# Patient Record
Sex: MALE | Race: WHITE | Employment: FULL TIME | URBAN - METROPOLITAN AREA
[De-identification: names, ages, dates, MRNs, and addresses within clinical notes are randomized per-mention and may not be internally consistent; named-entity substitution may affect disease eponyms.]

---

## 2019-10-15 ENCOUNTER — DOCUMENTATION ONLY (OUTPATIENT)
Dept: MATERNAL FETAL MEDICINE | Facility: CLINIC | Age: 34
End: 2019-10-15

## 2019-10-15 DIAGNOSIS — Z13.71 SCREENING FOR GENETIC DISEASE CARRIER STATUS: Primary | ICD-10-CM

## 2019-10-15 NOTE — PROGRESS NOTES
Order placed for GC only to facilitate carrier screening. Patient's partner (Janine Villalba,MRN: [id number]) is a carrier for cystic fibrosis and Pendred syndrome.    Trish Wade MS, [facility]  Maternal Fetal Medicine

## 2019-10-18 ENCOUNTER — OFFICE VISIT (OUTPATIENT)
Dept: MATERNAL FETAL MEDICINE | Facility: CLINIC | Age: 34
End: 2019-10-18
Attending: OBSTETRICS & GYNECOLOGY
Payer: COMMERCIAL

## 2019-10-18 DIAGNOSIS — Z13.71 SCREENING FOR GENETIC DISEASE CARRIER STATUS: ICD-10-CM

## 2019-10-18 PROCEDURE — 36415 COLL VENOUS BLD VENIPUNCTURE: CPT | Performed by: OBSTETRICS & GYNECOLOGY

## 2019-10-18 PROCEDURE — 40000954 ZZHCL STATISTIC COUNSYL FAMILY PREP: Performed by: OBSTETRICS & GYNECOLOGY

## 2019-10-18 PROCEDURE — 40000072 ZZH STATISTIC GENETIC COUNSELING, < 16 MIN: Mod: ZF | Performed by: GENETIC COUNSELOR, MS

## 2019-10-18 NOTE — PROGRESS NOTES
[facility]etal Medicine [place]  Genetic Counseling Consult    Patient:  Ilia Villalba YOB: 1985   Date of Service:  10/18/19      Ilia Villalba was seen at the [facility] for genetic consultation for the indication of carrier screening. The patient was unaccompanied to today's visit.        Impression/Plan:   1. The patient elected to pursue carrier screening through [facility] for cystic fibrosis and Pendred syndrome. Results are expected within 10-14 days, and will be available in [employer].  We will contact him to discuss the results. Ilia provided verbal permission for results to be left on his voicemail at [phone]. Consent to share protected health information with Ilia's partner, Waleska was signed today.        Risk Assessment:   The patient's partner, Waleska had previously underwent carrier screening and was identified to be a carrier for cystic fibrosis and Pendred syndrome.      Cystic fibrosis is an autosomal recessive condition. If Ilia is also found to be a carrier, the chance for the couple to have a child with the condition would be 25%; if Ilia is not found to be a carrier, the chance would be greatly reduced.    Pendred syndrome is an autosomal recessive condition associated with hearing loss and enlargement of the thyorid (goiter). If Ilia is also found to be a carrier, the chance for the couple to have a child with the condition would be 25%; if Ilia is not found to be a carrier, the chance would be greatly reduced.    We reviewed that if both partner's are found to be carriers for the same condition, diagnostic testing would be available to determine if the pregnancy has the condition. Yeyo shared that he would like to discuss this further upon results with his wife.     The patient elected to pursue carrier screening through [facility] for cystic fibrosis and Pendred syndrome (ID: [id number]). Results  are expected within 10-14 days, and will be available in [employer].  We will contact him to discuss the results.     It was a pleasure to be involved with Ilia s care. Face-to-face time of the meeting was 15 minutes.    Ruthy Morgan MS, [facility]  Maternal Fetal Medicine  [facility]  Ph: [phone]  [email or web address]    Patient seen, evaluated and discussed with the Genetic Counseling Intern. I have verified the content of the note, which accurately reflects my assessment of the patient and the plan of care.  Supervising Genetic Counselor  Yael Portillo MS, [facility]  Maternal Fetal Medicine  [facility]  Phone: [phone]  Email: [email or web address]

## 2019-10-28 LAB — LAB SCANNED RESULT: NORMAL

## 2019-10-29 ENCOUNTER — TELEPHONE (OUTPATIENT)
Dept: MATERNAL FETAL MEDICINE | Facility: CLINIC | Age: 34
End: 2019-10-29

## 2019-10-29 NOTE — TELEPHONE ENCOUNTER
October 29, 2019    Attempted to reach patient regarding carrier screening results. Unable to leave message as voicemail is full. Will attempt again this afternoon.     Ruthy Morgan MS, [place]  Maternal Fetal Medicine  [facility]  Ph: [phone]  [email or web address]

## 2019-10-29 NOTE — TELEPHONE ENCOUNTER
October 29, 2019    I spoke with Ilia to inform him that his carrier screening results are available. Ilia elected to pursue carrier screening through [employer] for cystic fibrosis and Pendred syndrome. Ilia was not found to be a carrier for either of these conditions, which greatly reduces the couple's chance to have a child with these conditions. Residual reproductive risk for cystic fibrosis following the couple's screen is now 1 in 12,000. The residual reproductive risk for pendred syndrome following the couple's screen is now 1 in 33,000.    Ilia stated that he would share this information with his wife and I encouraged to have her contact me with any additional questions or concerns. Consent to share protected health information with Ilia's partner, Waleska was signed at Ilia's genetic counseling visit. Ilia provided verbal permission for results to be released to his e-mail through [employer].     Ruthy Morgan MS, [facility]  Maternal Fetal Medicine  [facility]  Ph: [phone]  [email or web address]